# Patient Record
Sex: FEMALE | Race: WHITE | Employment: UNEMPLOYED | URBAN - METROPOLITAN AREA
[De-identification: names, ages, dates, MRNs, and addresses within clinical notes are randomized per-mention and may not be internally consistent; named-entity substitution may affect disease eponyms.]

---

## 2021-02-15 ENCOUNTER — HOSPITAL ENCOUNTER (OUTPATIENT)
Age: 51
Discharge: HOME | End: 2021-02-17

## 2021-02-15 PROCEDURE — 88305 TISSUE EXAM BY PATHOLOGIST: CPT

## 2021-02-15 PROCEDURE — 88360 TUMOR IMMUNOHISTOCHEM/MANUAL: CPT

## 2021-04-02 ENCOUNTER — HOSPITAL ENCOUNTER (OUTPATIENT)
Age: 51
Discharge: HOME | End: 2021-04-04

## 2021-04-02 PROCEDURE — 88333 PATH CONSLTJ SURG CYTO XM 1: CPT

## 2021-04-02 PROCEDURE — 88329 PATH CONSLTJ DRG SURG: CPT

## 2021-04-02 PROCEDURE — 88307 TISSUE EXAM BY PATHOLOGIST: CPT

## 2021-04-02 PROCEDURE — 88342 IMHCHEM/IMCYTCHM 1ST ANTB: CPT

## 2021-08-09 ENCOUNTER — TELEPHONE (OUTPATIENT)
Dept: CASE MANAGEMENT | Age: 51
End: 2021-08-09

## 2021-08-09 ENCOUNTER — TELEPHONE (OUTPATIENT)
Dept: INFUSION THERAPY | Age: 51
End: 2021-08-09

## 2021-08-09 ENCOUNTER — HOSPITAL ENCOUNTER (OUTPATIENT)
Dept: RADIATION ONCOLOGY | Age: 51
Discharge: HOME | End: 2021-08-09
Payer: COMMERCIAL

## 2021-08-09 VITALS
SYSTOLIC BLOOD PRESSURE: 130 MMHG | HEART RATE: 79 BPM | DIASTOLIC BLOOD PRESSURE: 82 MMHG | WEIGHT: 157.9 LBS | RESPIRATION RATE: 18 BRPM | TEMPERATURE: 97.4 F | OXYGEN SATURATION: 99 %

## 2021-08-09 DIAGNOSIS — C50.912 MALIGNANT NEOPLASM OF LEFT FEMALE BREAST, UNSPECIFIED ESTROGEN RECEPTOR STATUS, UNSPECIFIED SITE OF BREAST (HCC): Primary | ICD-10-CM

## 2021-08-09 PROCEDURE — 99205 OFFICE O/P NEW HI 60 MIN: CPT

## 2021-08-09 PROCEDURE — 99245 OFF/OP CONSLTJ NEW/EST HI 55: CPT | Performed by: RADIOLOGY

## 2021-08-09 NOTE — TELEPHONE ENCOUNTER
Benefits Investigation    Insurance: [facility]   Phone#:   ID#: [id number]   Group #:[id number]  Spoke with: Epic Registration  Reference #: 8/9/21 @11:28am    Calendar Year : yes     Chemo Auth Needed: yes    Annual Deductible Amount:  $300  Amount met to date: $300  Out-of-Pocket Max Amount: $1500   Amount met to date: $1500  Lifetime Maximum Amount: $unlimited   Amount met to date: $n/a    Patient will be covered at 100% of the allowed amount.  Electronically signed by Betty Cage on 8/9/2021 at 11:29 AM

## 2021-08-09 NOTE — PROGRESS NOTES
Mark Dress  1970 46 y.o. Referring Physician: Dr. Ned Whitehead    PCP: Randalyn Scheuermann, MD     Vitals:    21 1013   BP: 130/82   Pulse: 79   Resp: 18   Temp: 97.4 °F (36.3 °C)   SpO2: 99%        Wt Readings from Last 3 Encounters:   21 157 lb 14.4 oz (71.6 kg)        There is no height or weight on file to calculate BMI. Chief Complaint: No chief complaint on file. Cancer Staging  No matching staging information was found for the patient. Prior Radiation Therapy? NO    Concurrent Chemo/radiation? NO    Prior Chemotherapy? YES: Site Treated: l breast          Facility: [facility]          Date: current    Prior Hormonal Therapy? NO    Head and Neck Cancer? No, patient does NOT have HN cancer. LMP:     Age at first Menses: 4    : 3    Para: 2        No current outpatient medications on file. No current facility-administered medications for this encounter.        Past Medical History:   Diagnosis Date    Cancer [facility])        Past Surgical History:   Procedure Laterality Date    BREAST SURGERY      HYSTERECTOMY         Family History   Problem Relation Age of Onset    Breast Cancer Mother        Social History     Socioeconomic History    Marital status:      Spouse name: Not on file    Number of children: 2    Years of education: Not on file    Highest education level: Not on file   Occupational History    Not on file   Tobacco Use    Smoking status: Former Smoker     Packs/day: 0.25     Years: 6.00     Pack years: 1.50     Types: Cigarettes     Quit date: 2021     Years since quittin.3    Smokeless tobacco: Never Used   Vaping Use    Vaping Use: Never used   Substance and Sexual Activity    Alcohol use: Not Currently    Drug use: Yes     Frequency: 2.0 times per week     Types: Marijuana    Sexual activity: Not on file   Other Topics Concern    Not on file   Social History Narrative    Not on file     Social Determinants of Health     Financial Resource Strain:     Difficulty of Paying Living Expenses:    Food Insecurity:     Worried About Running Out of Food in the Last Year:     [address] in the Last Year:    Transportation Needs:     Lack of Transportation (Medical):  Lack of Transportation (Non-Medical):    Physical Activity:     Days of Exercise per Week:     Minutes of Exercise per Session:    Stress:     Feeling of Stress :    Social Connections:     Frequency of Communication with Friends and Family:     Frequency of Social Gatherings with Friends and Family:     Attends L[employer] Services:     Active Member of Clubs or Organizations:     Attends Club or Organization Meetings:     Marital Status:    Intimate Partner Violence:     Fear of Current or Ex-Partner:     Emotionally Abused:     Physically Abused:     Sexually Abused:            Occupation: unemployed  Retired:  NO        REVIEW OF SYSTEMS: <<For Level 5, 10 or more systems>>   Approximately 20 minutes was spent with patient about radiation therapy to her breast utilizing handouts and slides. In November 2020 pt had a mammogram showing abnormal left UIQ breast. On 2/15/21 pt had a L breast bx showing a grade 3 IDC ER/IA/HER2 negative. On 3/20/21 pt had an MRI of her breast showing a left breast lesion 9 o'clock measuring 2.4cm and no axilla involvement. On 3/11/21 ot had a CT scan which was negative of metastatic disease. PT also had a bone scan on 3/11/21 also showing no evidence of metastatic disease. On 4/2/21 pt had a left lumpectomy and sentinel lymph node excision , the SLN was negative, 1.9cm timor, no lymphovascular invasion, [id number], ER/IA/HER2 negative. Pt started chemo on 5/6/2021 with Taxotere and Cytoxan and completed chemo 3 weeks ago. Pt verbalized understanding of care and all questions were answered from a nursing perspective.   Pacemaker/Defibulator/ICD:  No    Mediport: Yes        FALLS RISK SCREENING ASSESSMENT    Instructions:  Assess the patient and enter the appropriate indicators that are present for fall risk identification. Total the numbers entered and assign a fall risk score from Table 2.  Reassess patient at a minimum every 12 weeks or with status change. Assessment   Date  8/9/2021     1. Mental Ability: confusion/cognitively impaired No - 0       2. Elimination Issues: incontinence, frequency No - 0       3. Ambulatory: use of assistive devices (walker, cane, off-loading devices), attached to equipment (IV pole, oxygen) No - 0     4. Sensory Limitations: dizziness, vertigo, impaired vision Yes - 3       5. Age Less than 65 years - 0       6. Medication: diuretics, strong analgesics, hypnotics, sedatives, antihypertensive agents   No - 0   7. Falls:  recent history of falls within the last 3 months (not to include slipping or tripping)   No - 0   TOTAL 3    If score of 4 or greater was education given? Yes       TABLE 2   Risk Score Risk Level Plan of Care   0-3 Little or  No Risk 1. Provide assistance as indicated for ambulation activities  2. Reorient confused/cognitively impaired patient  3. Call-light/bell within patient's reach  4. Chair/bed in low position, stretcher/bed with siderails up except when performing patient care activities  5. Educate patient/family/caregiver on falls prevention  6.  Reassess in 12 weeks or with any noted change in patient condition which places them at a risk for a fall   4-6 Moderate Risk 1. Provide assistance as indicated for ambulation activities  2. Reorient confused/cognitively impaired patient  3. Call-light/bell within patient's reach  4. Chair/bed in low position, stretcher/bed with siderails up except when performing patient care activities  5. Educate patient/family/caregiver on falls prevention  6. Falls risk precaution (Yellow sticker Level II) placed on patient chart   7 or   Higher High Risk 1.   Place patient in easily observable ANSWERED YES: No          PT ASSESSMENT FOR REFERRAL    · Have you had any recent falls in the past 2 months: No     · Do you have difficulty going up/down stairs: No     · Are you having difficulty walking: No     · Do you often hold onto furniture/environmental supports or feel off balance when you are walking: No     · Do you need to take rest breaks when you are walking: No     · Any pain on a scale of 1-10 that limits your mobility: No 0/10    ARE ANY OF THE ABOVE ARE ANSWERED YES: No           LYMPHEDEMA SCREENING ASSESSMENT FOR PATIENTS WITH BREAST CANCER    The patient reports the following signs/symptoms of lymphedema: None    Please ask the provider to assess patient for lymphedema for any reported signs or symptoms so a referral to Lymphedema Therapy can be considered. PREHAB AUDIOLOGY REFERRAL    - Is patient planned to receive Cisplatin? No. This patient is not planned to start Cisplatin. - Is patient planned to receive radiation therapy that may be directed toward auditory canals or nerves? No. Patient is not planned to start radiation therapy to auditory canals or nerves. - Is patient complaining of new onset hearing loss? No. Patient is not complaining of new onset hearing loss. Patient education given on radiation therapy to her left breast. The patient expresses understanding and acceptance of instructions.  Chente Cruz RN 8/9/2021 10:17 AM           Chente Cruz RN

## 2021-08-09 NOTE — TELEPHONE ENCOUNTER
Met with patient during her initial consultation with Dr. Khoi Booker  for her recent Breast cancer diagnosis. Introduced myself and explained my role with patients receiving treatment at our center. Patient was friendly and receptive. She follows with Dr Richard Raines for Medical Oncology and completed adjuvant chemo with Taxotere/Cytoxan approximately 3 weeks ago per the patient. 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 Copy of her pathology findings given including cancer type. Instructed on next steps including radiation treatments per Dr. Moss  recommendations and follow up care. Provided patient with  literature  on Patient Resource Breast Cancer, [facility]. Reviewed resources available including Social Work, Dietician, and Financial Navigator. Provided with my contact information and instructed patient to call me with questions or concerns. Verbalizes understanding. Patient appreciative of visit. Will continue to follow.

## 2021-08-11 ENCOUNTER — HOSPITAL ENCOUNTER (OUTPATIENT)
Dept: RADIATION ONCOLOGY | Age: 51
Discharge: HOME | End: 2021-08-11
Attending: SPECIALIST
Payer: COMMERCIAL

## 2021-08-11 PROCEDURE — 77290 THER RAD SIMULAJ FIELD CPLX: CPT | Performed by: SPECIALIST

## 2021-08-11 PROCEDURE — 77332 RADIATION TREATMENT AID(S): CPT | Performed by: SPECIALIST

## 2021-08-11 PROCEDURE — 77332 RADIATION TREATMENT AID(S): CPT | Performed by: RADIOLOGY

## 2021-08-11 PROCEDURE — 77263 THER RADIOLOGY TX PLNG CPLX: CPT | Performed by: RADIOLOGY

## 2021-08-12 ENCOUNTER — APPOINTMENT (OUTPATIENT)
Dept: RADIATION ONCOLOGY | Age: 51
End: 2021-08-12
Attending: SPECIALIST
Payer: COMMERCIAL

## 2021-08-23 ENCOUNTER — HOSPITAL ENCOUNTER (OUTPATIENT)
Dept: RADIATION ONCOLOGY | Age: 51
Discharge: HOME | End: 2021-08-23
Attending: RADIOLOGY
Payer: COMMERCIAL

## 2021-08-23 PROCEDURE — 77338 DESIGN MLC DEVICE FOR IMRT: CPT | Performed by: RADIOLOGY

## 2021-08-23 PROCEDURE — 77301 RADIOTHERAPY DOSE PLAN IMRT: CPT | Performed by: RADIOLOGY

## 2021-08-23 PROCEDURE — 77293 RESPIRATOR MOTION MGMT SIMUL: CPT | Performed by: RADIOLOGY

## 2021-08-23 PROCEDURE — 77300 RADIATION THERAPY DOSE PLAN: CPT | Performed by: RADIOLOGY

## 2021-08-25 ENCOUNTER — HOSPITAL ENCOUNTER (OUTPATIENT)
Dept: RADIATION ONCOLOGY | Age: 51
Discharge: HOME | End: 2021-08-25
Attending: RADIOLOGY
Payer: COMMERCIAL

## 2021-08-25 VITALS
TEMPERATURE: 97.6 F | DIASTOLIC BLOOD PRESSURE: 78 MMHG | HEART RATE: 78 BPM | OXYGEN SATURATION: 98 % | RESPIRATION RATE: 18 BRPM | SYSTOLIC BLOOD PRESSURE: 132 MMHG

## 2021-08-25 PROCEDURE — 77014 PR CT GUIDANCE PLACEMENT RAD THERAPY FIELDS: CPT | Performed by: RADIOLOGY

## 2021-08-25 PROCEDURE — 77385 HC NTSTY MODUL RAD TX DLVR SMPL: CPT | Performed by: RADIOLOGY

## 2021-08-25 NOTE — PROGRESS NOTES
Kiko Cruz  8/25/2021  Wt Readings from Last 3 Encounters:   08/09/21 157 lb 14.4 oz (71.6 kg)     There is no height or weight on file to calculate BMI. Treatment Area:CTV L breast    Patient was seen today for weekly visit. Comfort Alteration  KPS:90%  Fatigue: Mild    Nutritional Alteration  Anorexia: No   Nausea: No   Vomiting: No     Skin Alteration   Sensation:no    Radiation Dermatitis:  no    Mucous Membrane Alteration  Drainage: No  Lymphedema: No    Emotional  Coping: effective    Sexuality Alteration  na    Injury, potential bleeding or infection: no        Lab Results   Component Value Date    WBC 8.8 12/07/2016     12/07/2016         /78   Pulse 78   Temp 97.6 °F (36.4 °C) (Temporal)   Resp 18   SpO2 98%   BP within normal range?  yes        Assessment/Plan: Pt is tolerating tx well thus far/,    Leandra Hamlin RN

## 2021-08-26 ENCOUNTER — HOSPITAL ENCOUNTER (OUTPATIENT)
Dept: RADIATION ONCOLOGY | Age: 51
Discharge: HOME | End: 2021-08-26
Attending: RADIOLOGY
Payer: COMMERCIAL

## 2021-08-26 PROCEDURE — 77385 HC NTSTY MODUL RAD TX DLVR SMPL: CPT | Performed by: RADIOLOGY

## 2021-08-27 ENCOUNTER — HOSPITAL ENCOUNTER (OUTPATIENT)
Dept: RADIATION ONCOLOGY | Age: 51
Discharge: HOME | End: 2021-08-27
Attending: RADIOLOGY
Payer: COMMERCIAL

## 2021-08-27 PROCEDURE — 77385 HC NTSTY MODUL RAD TX DLVR SMPL: CPT | Performed by: RADIOLOGY

## 2021-08-30 ENCOUNTER — HOSPITAL ENCOUNTER (OUTPATIENT)
Dept: RADIATION ONCOLOGY | Age: 51
Discharge: HOME | End: 2021-08-30
Attending: RADIOLOGY
Payer: COMMERCIAL

## 2021-08-30 PROCEDURE — 77385 HC NTSTY MODUL RAD TX DLVR SMPL: CPT | Performed by: RADIOLOGY

## 2021-08-31 ENCOUNTER — HOSPITAL ENCOUNTER (OUTPATIENT)
Dept: RADIATION ONCOLOGY | Age: 51
Discharge: HOME | End: 2021-08-31
Attending: RADIOLOGY
Payer: COMMERCIAL

## 2021-08-31 PROCEDURE — 77336 RADIATION PHYSICS CONSULT: CPT | Performed by: RADIOLOGY

## 2021-08-31 PROCEDURE — 77385 HC NTSTY MODUL RAD TX DLVR SMPL: CPT | Performed by: RADIOLOGY

## 2021-09-01 ENCOUNTER — HOSPITAL ENCOUNTER (OUTPATIENT)
Dept: RADIATION ONCOLOGY | Age: 51
Discharge: HOME | End: 2021-09-01
Attending: RADIOLOGY
Payer: COMMERCIAL

## 2021-09-01 VITALS
DIASTOLIC BLOOD PRESSURE: 78 MMHG | OXYGEN SATURATION: 98 % | TEMPERATURE: 96.8 F | HEART RATE: 75 BPM | SYSTOLIC BLOOD PRESSURE: 120 MMHG | RESPIRATION RATE: 18 BRPM | WEIGHT: 153.5 LBS

## 2021-09-01 DIAGNOSIS — C50.919 MALIGNANT NEOPLASM OF FEMALE BREAST, UNSPECIFIED ESTROGEN RECEPTOR STATUS, UNSPECIFIED LATERALITY, UNSPECIFIED SITE OF BREAST (HCC): Primary | ICD-10-CM

## 2021-09-01 PROCEDURE — 77385 HC NTSTY MODUL RAD TX DLVR SMPL: CPT | Performed by: RADIOLOGY

## 2021-09-01 PROCEDURE — 99999 PR OFFICE/OUTPT VISIT,PROCEDURE ONLY: CPT | Performed by: RADIOLOGY

## 2021-09-01 ASSESSMENT — PAIN DESCRIPTION - FREQUENCY: FREQUENCY: INTERMITTENT

## 2021-09-01 ASSESSMENT — PAIN DESCRIPTION - DESCRIPTORS: DESCRIPTORS: DISCOMFORT

## 2021-09-01 ASSESSMENT — PAIN DESCRIPTION - ORIENTATION: ORIENTATION: LEFT

## 2021-09-01 ASSESSMENT — PAIN SCALES - GENERAL: PAINLEVEL_OUTOF10: 5

## 2021-09-01 ASSESSMENT — PAIN DESCRIPTION - LOCATION: LOCATION: BREAST

## 2021-09-01 NOTE — PROGRESS NOTES
Phyllis Infante  9/1/2021  Wt Readings from Last 3 Encounters:   09/01/21 153 lb 8 oz (69.6 kg)   08/09/21 157 lb 14.4 oz (71.6 kg)     There is no height or weight on file to calculate BMI. Treatment Area:Left breast    Patient was seen today for weekly visit. Comfort Alteration  KPS:90%  Fatigue: None    Nutritional Alteration  Anorexia: No   Nausea: No   Vomiting: No     Skin Alteration   Sensation:itching    Radiation Dermatitis:  None, moisturizing skin at least 3 times    Mucous Membrane Alteration  Drainage: No  Lymphedema: No    Emotional  Coping: effective    Sexuality Alteration  na    Injury, potential bleeding or infection: na        Lab Results   Component Value Date    WBC 8.8 12/07/2016     12/07/2016         Pulse 75   Temp 96.8 °F (36 °C) (Temporal)   Resp 18   Wt 153 lb 8 oz (69.6 kg)   SpO2 98%   BP within normal range?  yes     Assessment/Plan:  Completed 6/21 fractions; 1596/5256 cGy, no complaints    Rosario Mckeon RN

## 2021-09-02 ENCOUNTER — HOSPITAL ENCOUNTER (OUTPATIENT)
Dept: RADIATION ONCOLOGY | Age: 51
Discharge: HOME | End: 2021-09-02
Attending: RADIOLOGY
Payer: COMMERCIAL

## 2021-09-02 PROCEDURE — 77385 HC NTSTY MODUL RAD TX DLVR SMPL: CPT | Performed by: RADIOLOGY

## 2021-09-02 NOTE — PROGRESS NOTES
DEPARTMENT OF RADIATION ONCOLOGY ON TREATMENT VISIT         9/2/2021      NAME:  Phil Agudelo    YOB: 1970    Diagnosis: breast cancer    SUBJECTIVE:   Phil Agudelo has now received fractionated external beam radiation therapy - ongoing. Past medical, surgical, social and family histories reviewed and updated as indicated. Pain: controlled    ALLERGIES:  Patient has no known allergies. No current outpatient medications on file. No current facility-administered medications for this encounter. OBJECTIVE:  Alert and fully ambulatory. Pleasant and conversant. Physical Examination: General appearance - alert, well appearing, and in no distress. Wt Readings from Last 3 Encounters:   09/01/21 153 lb 8 oz (69.6 kg)   08/09/21 157 lb 14.4 oz (71.6 kg)         ASSESSMENT/PLAN:     Patient is tolerating treatments well with expected toxicities. RBA were reviewed prior to first fraction and PRN. Current and planned dose reviewed. Goals of treatment and potential side effects were reviewed with the patient PRN. Treatment imaging has been personally reviewed for accuracy and precision. Questions answered to apparent satisfaction. Treatments will continue as planned. Jennifer Ritchie.  Gonsalo Thornton MD MS SHAJI  Radiation Oncologist        PHYSICIANS [facility] ([address] [place] [address]): [phone] /// FAX: [phone]  [facility]): [phone] /// FAX: [phone]  [address] [place] [address]): [phone] /// FAX: [phone]

## 2021-09-03 ENCOUNTER — HOSPITAL ENCOUNTER (OUTPATIENT)
Dept: RADIATION ONCOLOGY | Age: 51
Discharge: HOME | End: 2021-09-03
Attending: RADIOLOGY
Payer: COMMERCIAL

## 2021-09-03 PROCEDURE — 77385 HC NTSTY MODUL RAD TX DLVR SMPL: CPT | Performed by: RADIOLOGY

## 2021-09-07 ENCOUNTER — HOSPITAL ENCOUNTER (OUTPATIENT)
Dept: RADIATION ONCOLOGY | Age: 51
Discharge: HOME | End: 2021-09-07
Attending: RADIOLOGY
Payer: COMMERCIAL

## 2021-09-07 PROCEDURE — 77385 HC NTSTY MODUL RAD TX DLVR SMPL: CPT | Performed by: RADIOLOGY

## 2021-09-07 PROCEDURE — 77300 RADIATION THERAPY DOSE PLAN: CPT | Performed by: RADIOLOGY

## 2021-09-07 PROCEDURE — 77334 RADIATION TREATMENT AID(S): CPT | Performed by: RADIOLOGY

## 2021-09-08 ENCOUNTER — HOSPITAL ENCOUNTER (OUTPATIENT)
Dept: RADIATION ONCOLOGY | Age: 51
Discharge: HOME | End: 2021-09-08
Attending: RADIOLOGY
Payer: COMMERCIAL

## 2021-09-08 VITALS
SYSTOLIC BLOOD PRESSURE: 106 MMHG | WEIGHT: 151 LBS | HEART RATE: 84 BPM | DIASTOLIC BLOOD PRESSURE: 62 MMHG | OXYGEN SATURATION: 99 % | TEMPERATURE: 97 F | RESPIRATION RATE: 18 BRPM

## 2021-09-08 DIAGNOSIS — C50.919 MALIGNANT NEOPLASM OF FEMALE BREAST, UNSPECIFIED ESTROGEN RECEPTOR STATUS, UNSPECIFIED LATERALITY, UNSPECIFIED SITE OF BREAST (HCC): Primary | ICD-10-CM

## 2021-09-08 PROCEDURE — 77014 PR CT GUIDANCE PLACEMENT RAD THERAPY FIELDS: CPT | Performed by: RADIOLOGY

## 2021-09-08 PROCEDURE — 77336 RADIATION PHYSICS CONSULT: CPT | Performed by: RADIOLOGY

## 2021-09-08 PROCEDURE — 77385 HC NTSTY MODUL RAD TX DLVR SMPL: CPT | Performed by: RADIOLOGY

## 2021-09-08 ASSESSMENT — PAIN DESCRIPTION - ORIENTATION: ORIENTATION: LEFT

## 2021-09-08 ASSESSMENT — PAIN DESCRIPTION - LOCATION: LOCATION: BREAST

## 2021-09-08 ASSESSMENT — PAIN DESCRIPTION - FREQUENCY: FREQUENCY: INTERMITTENT

## 2021-09-08 ASSESSMENT — PAIN DESCRIPTION - DESCRIPTORS: DESCRIPTORS: OTHER (COMMENT)

## 2021-09-08 ASSESSMENT — PAIN SCALES - GENERAL: PAINLEVEL_OUTOF10: 5

## 2021-09-08 NOTE — PROGRESS NOTES
Pascual Brock  9/8/2021  Wt Readings from Last 3 Encounters:   09/08/21 151 lb (68.5 kg)   09/01/21 153 lb 8 oz (69.6 kg)   08/09/21 157 lb 14.4 oz (71.6 kg)     There is no height or weight on file to calculate BMI. Treatment Area:Left breast    Patient was seen today for weekly visit. Comfort Alteration  KPS:90%  Fatigue: Mild    Nutritional Alteration  Anorexia: Yes   Nausea: Yes/ a little bit  Vomiting: No     Skin Alteration   Sensation:itching    Radiation Dermatitis:  Discoloration, moisturizing skin at least 2 times daily     Mucous Membrane Alteration  Drainage: No  Lymphedema: No    Emotional  Coping: effective    Sexuality Alteration  na    Injury, potential bleeding or infection: na        Lab Results   Component Value Date    WBC 8.8 12/07/2016     12/07/2016         /62   Pulse 84   Temp 97 °F (36.1 °C) (Temporal)   Resp 18   Wt 151 lb (68.5 kg)   SpO2 99%   BP within normal range?  yes       Assessment/Plan:  Completed 10/21 fractions; 6979/7247 cGy     Fe Martin RN

## 2021-09-09 ENCOUNTER — HOSPITAL ENCOUNTER (OUTPATIENT)
Dept: RADIATION ONCOLOGY | Age: 51
Discharge: HOME | End: 2021-09-09
Attending: RADIOLOGY
Payer: COMMERCIAL

## 2021-09-09 PROCEDURE — 77385 HC NTSTY MODUL RAD TX DLVR SMPL: CPT | Performed by: RADIOLOGY

## 2021-09-09 PROCEDURE — 77427 RADIATION TX MANAGEMENT X5: CPT | Performed by: RADIOLOGY

## 2021-09-09 NOTE — PATIENT INSTRUCTIONS
Continue daily fractionated radiation therapy as scheduled. Please see weekly OTV note and intial consultation letter in [facility] for clinical details. Alberta Dunlap. Tavon England MD MS Rupali Bass:  [phone]   FAX: [phone]  [facility]:  [phone]   FAX:    [phone]  [facility]:  [phone]   FAX:  [phone]  Email: [email or web address]

## 2021-09-10 ENCOUNTER — HOSPITAL ENCOUNTER (OUTPATIENT)
Dept: RADIATION ONCOLOGY | Age: 51
Discharge: HOME | End: 2021-09-10
Attending: RADIOLOGY
Payer: COMMERCIAL

## 2021-09-10 PROCEDURE — 77385 HC NTSTY MODUL RAD TX DLVR SMPL: CPT | Performed by: RADIOLOGY

## 2021-09-13 ENCOUNTER — HOSPITAL ENCOUNTER (OUTPATIENT)
Dept: RADIATION ONCOLOGY | Age: 51
Discharge: HOME | End: 2021-09-13
Attending: RADIOLOGY
Payer: COMMERCIAL

## 2021-09-13 PROCEDURE — 77385 HC NTSTY MODUL RAD TX DLVR SMPL: CPT | Performed by: RADIOLOGY

## 2021-09-14 ENCOUNTER — HOSPITAL ENCOUNTER (OUTPATIENT)
Dept: RADIATION ONCOLOGY | Age: 51
Discharge: HOME | End: 2021-09-14
Attending: RADIOLOGY
Payer: COMMERCIAL

## 2021-09-14 PROCEDURE — 77385 HC NTSTY MODUL RAD TX DLVR SMPL: CPT | Performed by: RADIOLOGY

## 2021-09-15 ENCOUNTER — HOSPITAL ENCOUNTER (OUTPATIENT)
Dept: RADIATION ONCOLOGY | Age: 51
Discharge: HOME | End: 2021-09-15
Attending: RADIOLOGY
Payer: COMMERCIAL

## 2021-09-15 VITALS
WEIGHT: 152 LBS | OXYGEN SATURATION: 99 % | HEART RATE: 76 BPM | RESPIRATION RATE: 18 BRPM | TEMPERATURE: 97.6 F | SYSTOLIC BLOOD PRESSURE: 116 MMHG | DIASTOLIC BLOOD PRESSURE: 74 MMHG

## 2021-09-15 DIAGNOSIS — C50.919 MALIGNANT NEOPLASM OF FEMALE BREAST, UNSPECIFIED ESTROGEN RECEPTOR STATUS, UNSPECIFIED LATERALITY, UNSPECIFIED SITE OF BREAST (HCC): Primary | ICD-10-CM

## 2021-09-15 PROCEDURE — 77385 HC NTSTY MODUL RAD TX DLVR SMPL: CPT | Performed by: RADIOLOGY

## 2021-09-15 PROCEDURE — 77336 RADIATION PHYSICS CONSULT: CPT | Performed by: RADIOLOGY

## 2021-09-15 PROCEDURE — 99999 PR OFFICE/OUTPT VISIT,PROCEDURE ONLY: CPT | Performed by: RADIOLOGY

## 2021-09-15 PROCEDURE — 77427 RADIATION TX MANAGEMENT X5: CPT | Performed by: RADIOLOGY

## 2021-09-15 PROCEDURE — 77014 PR CT GUIDANCE PLACEMENT RAD THERAPY FIELDS: CPT | Performed by: RADIOLOGY

## 2021-09-15 NOTE — PROGRESS NOTES
Keren Gonzalez  9/15/2021  Wt Readings from Last 3 Encounters:   09/15/21 152 lb (68.9 kg)   09/08/21 151 lb (68.5 kg)   09/01/21 153 lb 8 oz (69.6 kg)     There is no height or weight on file to calculate BMI. Treatment Area:L breast    Patient was seen today for weekly visit. Comfort Alteration  KPS:90%  Fatigue: Mild    Nutritional Alteration  Anorexia: No   Nausea: No   Vomiting: No     Skin Alteration   Sensation:open skin under left breast.     Radiation Dermatitis:  Yes. See above    Mucous Membrane Alteration  Drainage: No  Lymphedema: No    Emotional  Coping: effective    Sexuality Alteration  N/a    Injury, potential bleeding or infection: n/a        Lab Results   Component Value Date    WBC 8.8 12/07/2016     12/07/2016         /74   Pulse 76   Temp 97.6 °F (36.4 °C) (Temporal)   Resp 18   Wt 152 lb (68.9 kg)   SpO2 99%   BP within normal range? yes         Assessment/Plan: Pt completed 15/21fx and 3990/5256cGy. Pt is tolerating tx well thus far.     Jam Camejo RN

## 2021-09-16 ENCOUNTER — HOSPITAL ENCOUNTER (OUTPATIENT)
Dept: RADIATION ONCOLOGY | Age: 51
Discharge: HOME | End: 2021-09-16
Attending: RADIOLOGY
Payer: COMMERCIAL

## 2021-09-16 PROCEDURE — 77385 HC NTSTY MODUL RAD TX DLVR SMPL: CPT | Performed by: RADIOLOGY

## 2021-09-16 NOTE — PROGRESS NOTES
DEPARTMENT OF RADIATION ONCOLOGY ON TREATMENT VISIT         9/16/2021      NAME:  Stevphen Baumgarten    YOB: 1970    Diagnosis: breast cancer    SUBJECTIVE:   Stevphen Baumgarten has now received fractionated external beam radiation therapy - ongoing. Past medical, surgical, social and family histories reviewed and updated as indicated. Pain: controlled    ALLERGIES:  Patient has no known allergies. No current outpatient medications on file. No current facility-administered medications for this encounter. OBJECTIVE:  Alert and fully ambulatory. Pleasant and conversant. Physical Examination: General appearance - alert, well appearing, and in no distress. Wt Readings from Last 3 Encounters:   09/15/21 152 lb (68.9 kg)   09/08/21 151 lb (68.5 kg)   09/01/21 153 lb 8 oz (69.6 kg)         ASSESSMENT/PLAN:     Patient is tolerating treatments well with expected toxicities. RBA were reviewed prior to first fraction and PRN. Current and planned dose reviewed. Goals of treatment and potential side effects were reviewed with the patient PRN. Treatment imaging has been personally reviewed for accuracy and precision. Questions answered to apparent satisfaction. Treatments will continue as planned. Tori Ly.  Bill Teresa MD MS SHAJI  Radiation Oncologist        PHYSICIANS [facility] ([address] [place] [address]): [phone] /// FAX: [phone]  [facility]): [phone] /// FAX: [phone]  [address] [place] [address]): [phone] /// FAX: [phone]

## 2021-09-16 NOTE — PATIENT INSTRUCTIONS
Continue daily fractionated radiation therapy as scheduled. Please see weekly OTV note and intial consultation letter in [facility] for clinical details. Josh Mccoy. John Grey MD MS Sarah Wells:  [phone]   FAX: [phone]  [facility]:  [phone]   FAX:    [phone]  [facility]:  [phone]   FAX:  [phone]  Email: [email or web address]

## 2021-09-17 ENCOUNTER — HOSPITAL ENCOUNTER (OUTPATIENT)
Dept: RADIATION ONCOLOGY | Age: 51
Discharge: HOME | End: 2021-09-17
Attending: RADIOLOGY
Payer: COMMERCIAL

## 2021-09-17 PROCEDURE — 77412 RADIATION TX DELIVERY LVL 3: CPT | Performed by: RADIOLOGY

## 2021-09-20 ENCOUNTER — HOSPITAL ENCOUNTER (OUTPATIENT)
Dept: RADIATION ONCOLOGY | Age: 51
Discharge: HOME | End: 2021-09-20
Attending: RADIOLOGY
Payer: COMMERCIAL

## 2021-09-20 VITALS
OXYGEN SATURATION: 98 % | TEMPERATURE: 97.6 F | RESPIRATION RATE: 18 BRPM | SYSTOLIC BLOOD PRESSURE: 126 MMHG | HEART RATE: 74 BPM | WEIGHT: 149.2 LBS | DIASTOLIC BLOOD PRESSURE: 78 MMHG

## 2021-09-20 DIAGNOSIS — C50.919 MALIGNANT NEOPLASM OF FEMALE BREAST, UNSPECIFIED ESTROGEN RECEPTOR STATUS, UNSPECIFIED LATERALITY, UNSPECIFIED SITE OF BREAST (HCC): Primary | ICD-10-CM

## 2021-09-20 PROCEDURE — 77412 RADIATION TX DELIVERY LVL 3: CPT | Performed by: RADIOLOGY

## 2021-09-20 PROCEDURE — 99999 PR OFFICE/OUTPT VISIT,PROCEDURE ONLY: CPT | Performed by: RADIOLOGY

## 2021-09-20 ASSESSMENT — PAIN DESCRIPTION - ORIENTATION: ORIENTATION: LEFT

## 2021-09-20 ASSESSMENT — PAIN DESCRIPTION - DESCRIPTORS: DESCRIPTORS: BURNING

## 2021-09-20 ASSESSMENT — PAIN SCALES - GENERAL: PAINLEVEL_OUTOF10: 8

## 2021-09-20 ASSESSMENT — PAIN DESCRIPTION - FREQUENCY: FREQUENCY: CONTINUOUS

## 2021-09-20 ASSESSMENT — PAIN DESCRIPTION - LOCATION: LOCATION: BREAST

## 2021-09-20 NOTE — PROGRESS NOTES
Fabiola Nicole  9/20/2021  Wt Readings from Last 3 Encounters:   09/20/21 149 lb 3.2 oz (67.7 kg)   09/15/21 152 lb (68.9 kg)   09/08/21 151 lb (68.5 kg)     There is no height or weight on file to calculate BMI. Treatment Area:L breast    Patient was seen today for weekly visit. Comfort Alteration  KPS:90%  Fatigue: Moderate    Nutritional Alteration  Anorexia: No   Nausea: No   Vomiting: No     Skin Alteration   Sensation:yes open skin under neath left breast    Radiation Dermatitis:  Yes roby above    Mucous Membrane Alteration  Drainage: No  Lymphedema: No    Emotional  Coping: somewhat effective    Sexuality Alteration  n/a    Injury, potential bleeding or infection: yes open skin        Lab Results   Component Value Date    WBC 8.8 12/07/2016     12/07/2016         /78   Pulse 74   Temp 97.6 °F (36.4 °C) (Temporal)   Resp 18   Wt 149 lb 3.2 oz (67.7 kg)   SpO2 98%   BP within normal range? yes      Assessment/Plan: Pt completed 18/21fx and 4656/5256cGy. Pt is tolerating tx well.     Kane Duvall, RN

## 2021-09-21 ENCOUNTER — HOSPITAL ENCOUNTER (OUTPATIENT)
Dept: RADIATION ONCOLOGY | Age: 51
Discharge: HOME | End: 2021-09-21
Attending: RADIOLOGY
Payer: COMMERCIAL

## 2021-09-21 PROCEDURE — 77412 RADIATION TX DELIVERY LVL 3: CPT | Performed by: RADIOLOGY

## 2021-09-21 NOTE — PROGRESS NOTES
DEPARTMENT OF RADIATION ONCOLOGY ON TREATMENT VISIT         9/21/2021      NAME:  Yanna Bajwa    YOB: 1970    Diagnosis: breast cancer    SUBJECTIVE:   Yanna Bajwa has now received fractionated external beam radiation therapy - ongoing. Past medical, surgical, social and family histories reviewed and updated as indicated. Pain: controlled    ALLERGIES:  Patient has no known allergies. Current Outpatient Medications   Medication Sig Dispense Refill    silver sulfADIAZINE (SILVADENE) 1 % cream Apply topically daily. 85 g 3     No current facility-administered medications for this encounter. OBJECTIVE:  Alert and fully ambulatory. Pleasant and conversant. Physical Examination: NAD  -skin gr 2      Wt Readings from Last 3 Encounters:   09/20/21 149 lb 3.2 oz (67.7 kg)   09/15/21 152 lb (68.9 kg)   09/08/21 151 lb (68.5 kg)         ASSESSMENT/PLAN:     Patient is tolerating treatments well with expected toxicities. RBA were reviewed prior to first fraction and PRN. Current and planned dose reviewed. Goals of treatment and potential side effects were reviewed with the patient PRN. Treatment imaging has been personally reviewed for accuracy and precision. Questions answered to apparent satisfaction. Treatments will continue as planned.      -Eddie Cuevas.  Sean Harrison MD MS VELASQUEZR  Radiation Oncologist        [facility] ([address] [place] [address]): [phone] /// FAX: [phone]  [facility]): [phone] /// FAX: [phone]  [address] [place] [address]): [phone] /// FAX: [phone]

## 2021-09-21 NOTE — PATIENT INSTRUCTIONS
Continue daily fractionated radiation therapy as scheduled. Please see weekly OTV note and intial consultation letter in [facility] for clinical details. Basil Mathews. Karen Roque MD MS Lynette Hernandez:  [phone]   FAX: [phone]  [facility]:  [phone]   FAX:    [phone]  [address] [place] [address] Road:  [phone]   FAX:  [phone]  Email: [email or web address]

## 2021-09-22 ENCOUNTER — HOSPITAL ENCOUNTER (OUTPATIENT)
Dept: RADIATION ONCOLOGY | Age: 51
Discharge: HOME | End: 2021-09-22
Attending: RADIOLOGY
Payer: COMMERCIAL

## 2021-09-22 PROCEDURE — 77336 RADIATION PHYSICS CONSULT: CPT | Performed by: RADIOLOGY

## 2021-09-22 PROCEDURE — 77427 RADIATION TX MANAGEMENT X5: CPT | Performed by: RADIOLOGY

## 2021-09-22 PROCEDURE — 77412 RADIATION TX DELIVERY LVL 3: CPT | Performed by: RADIOLOGY

## 2021-09-23 ENCOUNTER — HOSPITAL ENCOUNTER (OUTPATIENT)
Dept: RADIATION ONCOLOGY | Age: 51
Discharge: HOME | End: 2021-09-23
Attending: RADIOLOGY
Payer: COMMERCIAL

## 2021-09-23 PROCEDURE — 77412 RADIATION TX DELIVERY LVL 3: CPT | Performed by: RADIOLOGY

## 2021-10-19 ENCOUNTER — TELEPHONE (OUTPATIENT)
Dept: ONCOLOGY | Age: 51
End: 2021-10-19

## 2021-10-19 ENCOUNTER — TELEPHONE (OUTPATIENT)
Dept: RADIATION ONCOLOGY | Age: 51
End: 2021-10-19

## 2021-10-19 NOTE — TELEPHONE ENCOUNTER
Rad Onc nurse returned a call to patient about her return to work forms. Waiting a call back from the message left.

## 2021-10-20 ENCOUNTER — TELEPHONE (OUTPATIENT)
Dept: ONCOLOGY | Age: 51
End: 2021-10-20

## 2021-10-20 NOTE — TELEPHONE ENCOUNTER
Spoke with pt again re: disability paperwork. Noted that  is not in office on this date to review and sign. Advised that pt will additionally need to complete \"Statement of Claim for Sickness and Accident Benefit\" included in packet and requiring pt's signature. Forms to be forwarded to MD for signature, pt to  completed forms. No additional needs identified at this time. Reviewed role of oncology SW and encouraged pt to notify this provider if additional needs arise.     Hugo Nair, MSW, LISW-S  Oncology Social Worker

## 2021-11-02 ENCOUNTER — HOSPITAL ENCOUNTER (OUTPATIENT)
Dept: RADIATION ONCOLOGY | Age: 51
Discharge: HOME | End: 2021-11-02
Attending: RADIOLOGY
Payer: COMMERCIAL

## 2021-11-02 VITALS
DIASTOLIC BLOOD PRESSURE: 86 MMHG | WEIGHT: 151.5 LBS | OXYGEN SATURATION: 98 % | HEART RATE: 84 BPM | TEMPERATURE: 97.8 F | RESPIRATION RATE: 18 BRPM | SYSTOLIC BLOOD PRESSURE: 124 MMHG

## 2021-11-02 DIAGNOSIS — Z17.1 MALIGNANT NEOPLASM OF LEFT BREAST IN FEMALE, ESTROGEN RECEPTOR NEGATIVE, UNSPECIFIED SITE OF BREAST (HCC): ICD-10-CM

## 2021-11-02 DIAGNOSIS — C50.912 MALIGNANT NEOPLASM OF LEFT BREAST IN FEMALE, ESTROGEN RECEPTOR NEGATIVE, UNSPECIFIED SITE OF BREAST (HCC): ICD-10-CM

## 2021-11-02 PROCEDURE — 99999 PR OFFICE/OUTPT VISIT,PROCEDURE ONLY: CPT | Performed by: NURSE PRACTITIONER

## 2021-11-02 ASSESSMENT — PAIN DESCRIPTION - DESCRIPTORS: DESCRIPTORS: SHOOTING

## 2021-11-02 ASSESSMENT — PAIN SCALES - GENERAL: PAINLEVEL_OUTOF10: 4

## 2021-11-02 ASSESSMENT — PAIN DESCRIPTION - LOCATION: LOCATION: BREAST

## 2021-11-02 ASSESSMENT — PAIN DESCRIPTION - ORIENTATION: ORIENTATION: LEFT

## 2021-11-02 NOTE — PROGRESS NOTES
Kenn Zeeion  11/2/2021  10:03 AM      Vitals:    11/02/21 1002   BP: 124/86   Pulse: 84   Resp: 18   Temp: 97.8 °F (36.6 °C)   SpO2: 98%    : Wt Readings from Last 3 Encounters:   11/02/21 151 lb 8 oz (68.7 kg)   09/20/21 149 lb 3.2 oz (67.7 kg)   09/15/21 152 lb (68.9 kg)                Current Outpatient Medications:     silver sulfADIAZINE (SILVADENE) 1 % cream, Apply topically daily. , Disp: 85 g, Rfl: 3      Patient is seen today in follow up for completion of RT L breast with boost 8/25/21-9/23/21 21fx/5256cGy. Pt follows Dr. Cesario Watson last seen on 8/9/21. Pt has no questions or concerns at this time. FALLS RISK SCREENING ASSESSMENT    Instructions:  Assess the patient and enter the appropriate indicators that are present for fall risk identification. Total the numbers entered and assign a fall risk score from Table 2.  Reassess patient at a minimum every 12 weeks or with status change. Assessment   Date  11/2/2021     1. Mental Ability: confusion/cognitively impaired No - 0       2. Elimination Issues: incontinence, frequency No - 0       3. Ambulatory: use of assistive devices (walker, cane, off-loading devices), attached to equipment (IV pole, oxygen) No - 0     4. Sensory Limitations: dizziness, vertigo, impaired vision Yes - 3       5. Age Less than 65 years - 0       6. Medication: diuretics, strong analgesics, hypnotics, sedatives, antihypertensive agents   No - 0   7. Falls:  recent history of falls within the last 3 months (not to include slipping or tripping)   No - 0   TOTAL 3    If score of 4 or greater was education given? Yes       TABLE 2   Risk Score Risk Level Plan of Care   0-3 Little or  No Risk 1. Provide assistance as indicated for ambulation activities  2. Reorient confused/cognitively impaired patient  3. Call-light/bell within patient's reach  4. Chair/bed in low position, stretcher/bed with siderails up except when performing patient care activities  5. Educate patient/family/caregiver on falls prevention  6.  Reassess in 12 weeks or with any noted change in patient condition which places them at a risk for a fall   4-6 Moderate Risk 1. Provide assistance as indicated for ambulation activities  2. Reorient confused/cognitively impaired patient  3. Call-light/bell within patient's reach  4. Chair/bed in low position, stretcher/bed with siderails up except when performing patient care activities  5. Educate patient/family/caregiver on falls prevention  6. Falls risk precaution (Yellow sticker Level II) placed on patient chart   7 or   Higher High Risk 1. Place patient in easily observable treatment room  2. Patient attended at all times by family member or staff  3. Provide assistance as indicated for ambulation activities  4. Reorient confused/cognitively impaired patient  5. Call-light/bell within patient's reach  6. Chair/bed in low position, stretcher/bed with siderails up except when performing patient care activities  7. Educate patient/family/caregiver on falls prevention  8. Falls risk precaution (Yellow sticker Level III) placed on patient chart           MALNUTRITION RISK SCREENING ASSESSMENT    11/2/2021   Patient:  Mili Hernandez  Sex:  female    Instructions:  Assess the patient and enter the appropriate indicators that are present for nutrition risk identification. Total the numbers entered and assign a risk score. Follow the appropriate action for total score listed below. Assessment   Date  11/2/2021     1. Have you lost weight without trying? 0- No     2. Have you been eating poorly because of a decreased appetite? 0- No   3. Do you have a diagnosis of head and neck cancer?       0- No                                                                                    TOTAL 0          Score of 0-1: No action  Score 2 or greater:  · For Non-Diabetic Patient: Recommend adding Ensure Complete 2 x daily and provide patient with Ensure wellness bag with coupons  · For Diabetic Patient: Recommend adding Glucerna Shake 2 x daily and provide patient with Glucerna Wellness bag with coupons  · Route to the dietitian via Billie Neal RN

## 2021-11-02 NOTE — PROGRESS NOTES
RADIATION ONCOLOGY  6 week follow up         11/2/2021      NAME:  Manuelito Dai OF BIRTH:  1970    CC: Pt returns today for re-assessment of radiation treatment area. HPI: Kenn Rose is a pleasant 46 y.o. female with a diagnosis of pT1c pN0 cM0 left breast cancer (triple negative) s/p BCS, adjuvant chemotherapy and adjuvant XRT. The patient underwent a course of radiation therapy to the left breast, which was completed on 9/23/21. She completed 5256 cGy in 21 fractions which includes a 1000 cGy in 5 fraction boost.                        States that she is doing well. Skin has healed well post radiation completion. Moisturizing as needed. Completes monthly self breast exams. Denies any new lumps/ bumps/ discharge from the nipple. Pt is following with Dr Cesario Watson for medical oncology. Next appt 11/8/21. Pain: Denies pain at this time. Past medical, surgical, social and family histories reviewed and updated as indicated. ALLERGIES:  Patient has no known allergies. Current Outpatient Medications   Medication Sig Dispense Refill    silver sulfADIAZINE (SILVADENE) 1 % cream Apply topically daily. 85 g 3     No current facility-administered medications for this encounter. Physical Examination:   Vitals:    11/02/21 1002   BP: 124/86   Pulse: 84   Resp: 18   Temp: 97.8 °F (36.6 °C)   TempSrc: Temporal   SpO2: 98%   Weight: 151 lb 8 oz (68.7 kg)       Wt Readings from Last 3 Encounters:   11/02/21 151 lb 8 oz (68.7 kg)   09/20/21 149 lb 3.2 oz (67.7 kg)   09/15/21 152 lb (68.9 kg)       Alert and fully ambulatory. Pleasant and conversant. Irradiated skin shows mild hyperpigmentation and dryness. HR reg, rhythm reg. Lungs CTA.         ASSESSMENT/PLAN:     pT1c pN0 cM0 left breast cancer (triple negative) s/p BCS, adjuvant chemotherapy and adjuvant radiation therapy to the left breast  completed on 9/23/21 (5256 cGy in 21 fractions which includes a 1000 cGy in 5 fraction boost). Patient is doing well post-radiation completion. Skin care and sun care reviewed. Signs and symptoms of recurrence reviewed. Cont to follow with Dr Britt Banegas for medical oncology. Next appt 21. I discussed follow up plans with Johnson Woodruff. At this time, I will see the patient back in 6 months for a post-radiation completion follow-up visit. Johnson Woodruff is to follow up with other providers involved in their care as directed (including but not limited to Medical Oncology, Primary Care, Pulmonary, and Surgery). The patient was given our contact number in the event that if at any time they change their mind and would like to return to the clinic to see either myself or one of the Radiation Oncologists, they can simply call us and we would be happy to see them sooner. Thank you for involving us in the management of this extremely pleasant patient. More than 15 min was in direct contact with pt coordinating/giving care. >50% of the visit was spent in counseling the pt on the following: Follow up care    The nurses notes were reviewed and incorporated into this assessment and plan. Questions answered to apparent satisfaction.       Tyrell Puente, MSN, RN, APRN-CNP  Nurse Practitioner for Radiation Oncology    PHYSICIANS [facility]) [facility]: [phone] (WBQ: [phone])  [facility]) [facility]: [phone])  [address] [place] [address]) [facility]:  [phone] (ROS:  [phone])

## 2022-02-18 ENCOUNTER — HOSPITAL ENCOUNTER (OUTPATIENT)
Dept: GENERAL RADIOLOGY | Age: 52
Discharge: HOME | End: 2022-02-20
Payer: COMMERCIAL

## 2022-02-18 DIAGNOSIS — Z85.3 HISTORY OF BREAST CANCER: ICD-10-CM

## 2022-02-18 DIAGNOSIS — N64.4 PAIN OF LEFT BREAST: ICD-10-CM

## 2022-02-18 PROCEDURE — G0279 TOMOSYNTHESIS, MAMMO: HCPCS

## 2022-05-05 ENCOUNTER — HOSPITAL ENCOUNTER (OUTPATIENT)
Dept: RADIATION ONCOLOGY | Age: 52
Discharge: HOME | End: 2022-05-05
Attending: RADIOLOGY
Payer: COMMERCIAL

## 2022-05-05 VITALS
DIASTOLIC BLOOD PRESSURE: 88 MMHG | WEIGHT: 150.1 LBS | OXYGEN SATURATION: 100 % | HEART RATE: 98 BPM | RESPIRATION RATE: 18 BRPM | SYSTOLIC BLOOD PRESSURE: 138 MMHG | TEMPERATURE: 97.9 F

## 2022-05-05 PROCEDURE — 99213 OFFICE O/P EST LOW 20 MIN: CPT | Performed by: RADIOLOGY

## 2022-05-05 PROCEDURE — 99213 OFFICE O/P EST LOW 20 MIN: CPT

## 2022-05-05 NOTE — PROGRESS NOTES
RADIATION ONCOLOGY  6 week follow up         5/5/2022      NAME:  Jenae Aponte OF BIRTH:  1970    CC: Pt returns today for re-assessment of radiation treatment area. HPI: Win Deigo is a pleasant 46 y.o. female with a diagnosis of pT1c pN0 cM0 left breast cancer (triple negative) s/p BCS, adjuvant chemotherapy and adjuvant XRT. The patient underwent a course of radiation therapy to the left breast, which was completed on 9/23/21. She completed 5256 cGy in 21 fractions which includes a 1000 cGy in 5 fraction boost.                        States that she is doing well. Skin has healed well post radiation completion. Moisturizing as needed. Completes monthly self breast exams. Denies any new lumps/ bumps/ discharge from the nipple. Denies cough, shortness of breath, bone pain or weight loss. Pt is following with Dr Mary Jane Pete for medical oncology. Most recent mammogram from February 2022:        FINDINGS:   Breast tissue is heterogeneously dense which may obscure small masses. There   are breast conservation therapy changes on the left. No suspicious mass,   microcalcifications, or architectural distortion identified in either breast.           Impression   Benign findings with no mammographic evidence of malignancy in either breast.       Recommendation:       Annual bilateral mammogram is advised with consideration for annual bilateral   screening ultrasound given the patient's breast density. BIRADS:   BIRADS - CATEGORY 2         Pain: Denies pain at this time. Past medical, surgical, social and family histories reviewed and updated as indicated. ALLERGIES:  Patient has no known allergies. Current Outpatient Medications   Medication Sig Dispense Refill    silver sulfADIAZINE (SILVADENE) 1 % cream Apply topically daily. (Patient not taking: Reported on 5/5/2022) 85 g 3     No current facility-administered medications for this encounter.          Physical Examination:   Vitals:    05/05/22 0928   BP: 138/88   Pulse: 98   Resp: 18   Temp: 97.9 °F (36.6 °C)   TempSrc: Skin   SpO2: 100%   Weight: 150 lb 1.6 oz (68.1 kg)       Wt Readings from Last 3 Encounters:   05/05/22 150 lb 1.6 oz (68.1 kg)   11/02/21 151 lb 8 oz (68.7 kg)   09/20/21 149 lb 3.2 oz (67.7 kg)       Alert and fully ambulatory. Pleasant and conversant. Irradiated skin shows mild hyperpigmentation and dryness. Also some mild edema throughout the breast diffusely. No dominant masses, nodularity or suspicious skin changes. HR reg, rhythm reg. Lungs CTA. ASSESSMENT/PLAN:     pT1c pN0 cM0 left breast cancer (triple negative) s/p BCS, adjuvant chemotherapy and adjuvant radiation therapy to the left breast  completed on 9/23/21 (5256 cGy in 21 fractions which includes a 1000 cGy in 5 fraction boost). Patient is doing well post-radiation completion. Skin care and sun care reviewed. Signs and symptoms of recurrence reviewed. Cont to follow with Dr Nena Morgan for medical oncology. I discussed follow up plans with Yeimy Bingham. At this time, I will see the patient back in 6 months for a post-radiation completion follow-up visit. I anticipate as needed visits thereafter   Yeimy Bingham is to follow up with other providers involved in their care as directed (including but not limited to Medical Oncology, Primary Care, Pulmonary, and Surgery). The patient was given our contact number in the event that if at any time they change their mind and would like to return to the clinic to see either myself or one of the Radiation Oncologists, they can simply call us and we would be happy to see them sooner. Thank you for involving us in the management of this extremely pleasant patient. More than 15 min was in direct contact with pt coordinating/giving care. >50% of the visit was spent in counseling the pt on the following:   Follow up care    The nurses notes were reviewed and incorporated into this assessment and plan. Questions answered to apparent satisfaction.     Dc Pérez MD    PHYSICIANS [facility]) [facility]: [phone] (EXF: [phone])  [address] [place] [address]: [phone] (ZHY: [phone])  [address] [facility]) [facility]:  [phone] (TYN:  [phone])

## 2022-05-05 NOTE — PROGRESS NOTES
Javi Fernandez  5/5/2022  9:30 AM      Vitals:    05/05/22 0928   BP: 138/88   Pulse: 98   Resp: 18   Temp: 97.9 °F (36.6 °C)   SpO2: 100%    : Wt Readings from Last 3 Encounters:   05/05/22 150 lb 1.6 oz (68.1 kg)   11/02/21 151 lb 8 oz (68.7 kg)   09/20/21 149 lb 3.2 oz (67.7 kg)                Current Outpatient Medications:     silver sulfADIAZINE (SILVADENE) 1 % cream, Apply topically daily. (Patient not taking: Reported on 5/5/2022), Disp: 85 g, Rfl: 3      Patient is seen today in follow up with Dr. David Sepulveda. Patient received RT CTV left breast with boost, 8/25/21-9/23/21 21fx/ 5256cGY and tolerating. Patient following Dr. Saranya Rhodes 4/2022 and mammogram 2/18/2022 and was negative. FALLS RISK SCREENING ASSESSMENT    Instructions:  Assess the patient and enter the appropriate indicators that are present for fall risk identification. Total the numbers entered and assign a fall risk score from Table 2.  Reassess patient at a minimum every 12 weeks or with status change. Assessment   Date  5/5/2022     1. Mental Ability: confusion/cognitively impaired No - 0       2. Elimination Issues: incontinence, frequency No - 0       3. Ambulatory: use of assistive devices (walker, cane, off-loading devices), attached to equipment (IV pole, oxygen) No - 0     4. Sensory Limitations: dizziness, vertigo, impaired vision Yes - 3       5. Age Less than 65 years - 0       6. Medication: diuretics, strong analgesics, hypnotics, sedatives, antihypertensive agents   No - 0   7. Falls:  recent history of falls within the last 3 months (not to include slipping or tripping)   NO-0   TOTAL 3    If score of 4 or greater was education given? Yes       TABLE 2   Risk Score Risk Level Plan of Care   0-3 Little or  No Risk 1. Provide assistance as indicated for ambulation activities  2. Reorient confused/cognitively impaired patient  3. Call-light/bell within patient's reach  4.   Chair/bed in low position, stretcher/bed with siderails up except when performing patient care activities  5. Educate patient/family/caregiver on falls prevention  6.  Reassess in 12 weeks or with any noted change in patient condition which places them at a risk for a fall   4-6 Moderate Risk 1. Provide assistance as indicated for ambulation activities  2. Reorient confused/cognitively impaired patient  3. Call-light/bell within patient's reach  4. Chair/bed in low position, stretcher/bed with siderails up except when performing patient care activities  5. Educate patient/family/caregiver on falls prevention  6. Falls risk precaution (Yellow sticker Level II) placed on patient chart   7 or   Higher High Risk 1. Place patient in easily observable treatment room  2. Patient attended at all times by family member or staff  3. Provide assistance as indicated for ambulation activities  4. Reorient confused/cognitively impaired patient  5. Call-light/bell within patient's reach  6. Chair/bed in low position, stretcher/bed with siderails up except when performing patient care activities  7. Educate patient/family/caregiver on falls prevention  8. Falls risk precaution (Yellow sticker Level III) placed on patient chart           MALNUTRITION RISK SCREENING ASSESSMENT    5/5/2022   Patient:  Theodora Marcelino  Sex:  female    Instructions:  Assess the patient and enter the appropriate indicators that are present for nutrition risk identification. Total the numbers entered and assign a risk score. Follow the appropriate action for total score listed below. Assessment   Date  5/5/2022     1. Have you lost weight without trying? 0- No     2. Have you been eating poorly because of a decreased appetite? 0- No   3. Do you have a diagnosis of head and neck cancer?       0- No                                                                                    TOTAL 0          Score of 0-1: No action  Score 2 or greater:  · For Non-Diabetic Patient: Recommend adding Ensure Complete 2 x daily and provide patient with Ensure wellness bag with coupons  · For Diabetic Patient: Recommend adding Glucerna Shake 2 x daily and provide patient with Glucerna Wellness bag with coupons  · Route to the dietitian via Yue Sarkar RN

## 2022-11-08 ENCOUNTER — TELEPHONE (OUTPATIENT)
Dept: CASE MANAGEMENT | Age: 52
End: 2022-11-08

## 2022-11-08 NOTE — TELEPHONE ENCOUNTER
Called and spoke with patient. She is scheduled for follow up with ALEJANDRO Bui CNP tomorrow at 1000. Reviewed appointment information. She verified she will be here for visit tomorrow. Reviewed with her that I will be out of office tomorrow but will be leaving her Treatment Summary/Survivorship Care Plan with the Nurse Practitioner to give to her. I reviewed in detail the Treatment Summary and Survivorship Care plan information with Cookie Miller. Updated her that she will get the written copy tomorrow along with some resource information including [facility] [employer], [employer], Patient Resource Survivorship booklet and Thriving and Surviving Social Work and [employer]. She verbalized understanding of all information and was very appreciative of call and info.

## 2022-11-09 ENCOUNTER — HOSPITAL ENCOUNTER (OUTPATIENT)
Dept: RADIATION ONCOLOGY | Age: 52
Discharge: HOME | End: 2022-11-09
Attending: RADIOLOGY
Payer: COMMERCIAL

## 2022-11-09 VITALS
HEART RATE: 90 BPM | DIASTOLIC BLOOD PRESSURE: 85 MMHG | WEIGHT: 152.2 LBS | SYSTOLIC BLOOD PRESSURE: 147 MMHG | RESPIRATION RATE: 18 BRPM | TEMPERATURE: 97.6 F | OXYGEN SATURATION: 100 %

## 2022-11-09 DIAGNOSIS — Z17.1 MALIGNANT NEOPLASM OF UPPER-INNER QUADRANT OF LEFT BREAST IN FEMALE, ESTROGEN RECEPTOR NEGATIVE (HCC): Primary | ICD-10-CM

## 2022-11-09 DIAGNOSIS — Z92.3 S/P RADIATION THERAPY > 12 WKS AGO: ICD-10-CM

## 2022-11-09 DIAGNOSIS — C50.212 MALIGNANT NEOPLASM OF UPPER-INNER QUADRANT OF LEFT BREAST IN FEMALE, ESTROGEN RECEPTOR NEGATIVE (HCC): Primary | ICD-10-CM

## 2022-11-09 PROCEDURE — 99213 OFFICE O/P EST LOW 20 MIN: CPT | Performed by: NURSE PRACTITIONER

## 2022-11-09 PROCEDURE — 99213 OFFICE O/P EST LOW 20 MIN: CPT

## 2022-11-09 NOTE — PROGRESS NOTES
Radiation Oncology   6 Month Follow Up Note  11/09/2022    Kinza Colon  Vitals:    11/09/22 1007   BP: (!) 147/85   Pulse: 90   Resp: 18   Temp: 97.6 °F (36.4 °C)   SpO2: 100%     Wt Readings from Last 1 Encounters:   11/09/22 152 lb 3.2 oz (69 kg)     CC: Patient is here for 6 month Radiation Oncology follow-up visit. HPI:  Kinza Colon is a pleasant 45 y.o. female with a diagnosis of pT1c pNo cMo invasive ductal carcinoma of the upper-inner quadrant of the left breast.  S/p left breast lumpectomy with SLN excision on 04/02/2021. ER/MA negative, HER2 negative. S/p adjuvant chemotherapy 4 cycles TC completed 07/08/2021. S/p adjuvant XRT completed 09/23/2021. Total dose 5256 cGy/ 21 fractions directed to the left breast (1000/ 5256 cGy LSB). The patient is seen today in 6 month Radiation Oncology follow-up visit. The patient states she is feeling well, denies skin complaints. Applies moisturizing lotion to skin at treatment site daily as needed. The patient is performing self breast examinations and denies lumps, bumps, skin rashes or nipple discharge. The patient denies left breast or left arm swelling. The patient reports episodes of sharp pain to left breast predominately to incisional scar area. Reports pain occurs/ resolves spontaneously within seconds. No associated symptoms. No fevers or chills. No nausea or vomiting. No change in appetite or weight. No productive cough or SOB. The patient completed bilateral mammogram 02/18/2022 that showed benign findings with no mammographic evidence of malignancy in either breast.     Patient is following with:    Saadia Tavera. Most recent visit 09/22/2022. Next visit 4 months.      Primary Care- Danielle Xiao MD      Past Medical History:   Diagnosis Date    Breast cancer ([address] 02/15/2021    IDC of upper inner quadrant of the left breast.    Cancer [facility])        Past Surgical History:   Procedure Laterality Date    BREAST SURGERY Left 2021    Left breast lumpectomy with SLN excision. CHOLECYSTECTOMY      HYSTERECTOMY (CERVIX STATUS UNKNOWN)  2016    DEVAUGHN + BSO         Social History     Socioeconomic History    Marital status:      Spouse name: Not on file    Number of children: 2    Years of education: Not on file    Highest education level: Not on file   Occupational History    Not on file   Tobacco Use    Smoking status: Former     Packs/day: 0.25     Years: 6.00     Pack years: 1.50     Types: Cigarettes     Quit date: 2021     Years since quittin.6    Smokeless tobacco: Never   Vaping Use    Vaping Use: Never used   Substance and Sexual Activity    Alcohol use: Not Currently    Drug use: Yes     Frequency: 2.0 times per week     Types: Marijuana Yolanda Ramírez)    Sexual activity: Not on file   Other Topics Concern    Not on file   Social History Narrative    Not on file     Social Determinants of Health     Financial Resource Strain: Not on file   Food Insecurity: Not on file   Transportation Needs: Not on file   Physical Activity: Not on file   Stress: Not on file   Social Connections: Not on file   Intimate Partner Violence: Not on file   Housing Stability: Not on file         Family History   Problem Relation Age of Onset    Breast Cancer Mother        Allergies:   Patient has no known allergies. No current outpatient medications on file. No current facility-administered medications for this encounter. REVIEW OF SYSTEMS:    Constitutional:  No fever, chills or rigors. Eyes: No changes in vision. No eye discharge or pain  ENT: No headaches, hearing loss or vertigo. No mouth sores or sore throat. No change in taste or smell. Cardiovascular: No chest discomfort or palpitations. No loss of consciousness or phlebitis. Respiratory: No SOB. No productive cough or hemoptysis. No wheezing or pleuritic pain. Gastrointestinal: No abdominal pain, appetite loss, blood in stools. No change in bowel habits.  No hematemesis   Genitourinary: Patient acknowledges no dysuria, trouble voiding, or hematuria. No increased frequency. Musculoskeletal: No gait disturbance, weakness or joint complaints. Integumentary: No rash or pruritis. Neurological: No headache, diplopia, change in muscle strength, numbness or tingling. No change in gait, balance, coordination, mood, affect, memory, mentation, behavior. Psychiatric: No anxiety, or depression. Endocrine: No temperature intolerance. No excessive thirst, fluid intake, or urination. No tremor. Hematologic/Lymphatic: No abnormal bruising or bleeding, blood clots or swollen lymph nodes. Allergic/Immunologic: No nasal congestion or hives. PHYSICAL EXAMINATION:   Vitals:    22 1007   BP: (!) 147/85   Pulse: 90   Resp: 18   Temp: 97.6 °F (36.4 °C)   TempSrc: Skin   SpO2: 100%   Weight: 152 lb 3.2 oz (69 kg)       There is no height or weight on file to calculate BMI. Appearance: Well-developed, well-nourished 45year old female. Skin: Warm and dry, no rashes or hives. Head: Normocephalic, atraumatic  Eyes: EOMI, no conjunctival erythema  ENMT: No pharyngeal erythema, MMM, no rhinorrhea. Neck: Supple, no elevated JVP  Chest: Left breast with no erythema. Well-healed surgical scar. Lungs: Clear to auscultation bilaterally. No wheezes, rales or rhonchi. Cardiac: Regular rate and rhythm, +S1S2, no murmurs apparent  Abdomen: Soft, round and non-tender. Bowel sounds present x 4. Extremities: Moves all extremities x 4, no lower extremity edema  Neurologic: Alert and oriented x 3.  No focal motor deficits apparent         IMAGIN/18/2022 Bilateral Mammogram: (ordered per Medical Oncology)      IMPRESSION:   Benign findings with no mammographic evidence of malignancy in either breast.         ASSESSMENT/PLAN:    Stage I (pT1c pNo cMo), triple negative Gr 3 invasive ductal carcinoma of the upper-inner quadrant of the left breast.  S/p left breast lumpectomy with SLN excision on 04/02/2021. S/p adjuvant chemotherapy 4 cycles TC completed 07/08/2021. S/p adjuvant XRT completed 09/23/2021. No evidence of malignancy in either breast per mammogram 02/18/2022. The patient is doing well post XRT completion. Skin care discussed including breast/ scar massage. Continue SBEs report any new finding to a healthcare provider. Imaging per Medical Oncology. I discussed follow up plans with Maria Del Carmen Horton. At this time, Continue Oncology follow-up visit with primary Medical Oncologist Dr. Nat Martinez. See Radiation Oncology as needed. Maria Del Carmen Horton instructed to follow up with other physicians involved in their care as directed (including but not limited to Medical Oncology, Breast Surgery and Primary Care). The patient was given our contact number in the event that if at any time they change their mind and would like to return to the clinic to see either myself or one of the Radiation Oncologists, they can simply call us and we would be happy to see them. Thank you for involving us in the management of this extremely pleasant patient. More than 25 min was in direct contact with pt coordinating/giving care. >50% of the visit was spent in counseling the pt on the following: Follow up care    The nurses notes were reviewed and incorporated into this assessment and plan.           Parveen Rueda, MSN, APRN-CNP  Certified Nurse Practitioner for [address] [place], [place] [address]: [phone]/ F: [phone]   [facility]: [phone] / F: [phone]

## 2022-11-09 NOTE — PROGRESS NOTES
Ashwin Jacob  11/9/2022  10:08 AM      Vitals:    11/09/22 1007   BP: (!) 147/85   Pulse: 90   Resp: 18   Temp: 97.6 °F (36.4 °C)   SpO2: 100%    : Wt Readings from Last 3 Encounters:   11/09/22 152 lb 3.2 oz (69 kg)   05/05/22 150 lb 1.6 oz (68.1 kg)   11/02/21 151 lb 8 oz (68.7 kg)                Current Outpatient Medications:     silver sulfADIAZINE (SILVADENE) 1 % cream, Apply topically daily. (Patient not taking: Reported on 5/5/2022), Disp: 85 g, Rfl: 3      Patient is seen today in follow up for triple negative left breast cancer with Carlos ALLEN NP. The patient had CTV L breast with boost 08/25-09/23/2021 and received 21fx/ 5256 cGY. She completed 4 cycles of Taxotere and Cytoxan on 07/08/2021. She had a mammogram on 02/18/2022 that was negative for disease. She follows with Dr Xiomara De Los Santos for medical oncology, last seen 9/22/2022. The patient's only complaint today is a sharp pain in her left breast scar area. Dr Xiomara De Los Santos and NP notified. All questions addressed from a nursing perspective. FALLS RISK SCREENING ASSESSMENT    Instructions:  Assess the patient and enter the appropriate indicators that are present for fall risk identification. Total the numbers entered and assign a fall risk score from Table 2.  Reassess patient at a minimum every 12 weeks or with status change. Assessment   Date  11/9/2022     1. Mental Ability: confusion/cognitively impaired No - 0       2. Elimination Issues: incontinence, frequency No - 0       3. Ambulatory: use of assistive devices (walker, cane, off-loading devices), attached to equipment (IV pole, oxygen) No - 0     4. Sensory Limitations: dizziness, vertigo, impaired vision Yes - 3       5. Age Less than 65 years - 0       6. Medication: diuretics, strong analgesics, hypnotics, sedatives, antihypertensive agents   No - 0   7.   Falls:  recent history of falls within the last 3 months (not to include slipping or tripping)   No - 0   TOTAL 3    If score of 4 or greater was education given? No       TABLE 2   Risk Score Risk Level Plan of Care   0-3 Little or  No Risk 1. Provide assistance as indicated for ambulation activities  2. Reorient confused/cognitively impaired patient  3. Call-light/bell within patient's reach  4. Chair/bed in low position, stretcher/bed with siderails up except when performing patient care activities  5. Educate patient/family/caregiver on falls prevention  6.  Reassess in 12 weeks or with any noted change in patient condition which places them at a risk for a fall   4-6 Moderate Risk 1. Provide assistance as indicated for ambulation activities  2. Reorient confused/cognitively impaired patient  3. Call-light/bell within patient's reach  4. Chair/bed in low position, stretcher/bed with siderails up except when performing patient care activities  5. Educate patient/family/caregiver on falls prevention  6. Falls risk precaution (Yellow sticker Level II) placed on patient chart   7 or   Higher High Risk 1. Place patient in easily observable treatment room  2. Patient attended at all times by family member or staff  3. Provide assistance as indicated for ambulation activities  4. Reorient confused/cognitively impaired patient  5. Call-light/bell within patient's reach  6. Chair/bed in low position, stretcher/bed with siderails up except when performing patient care activities  7. Educate patient/family/caregiver on falls prevention  8. Falls risk precaution (Yellow sticker Level III) placed on patient chart           MALNUTRITION RISK SCREENING ASSESSMENT    11/9/2022   Patient:  Trupti Blake  Sex:  female    Instructions:  Assess the patient and enter the appropriate indicators that are present for nutrition risk identification. Total the numbers entered and assign a risk score. Follow the appropriate action for total score listed below. Assessment   Date  11/9/2022     Have you lost weight without trying? 0- No     Have you been eating poorly because of a decreased appetite? 0- No   3. Do you have a diagnosis of head and neck cancer? 0- No                                                                                    TOTAL 0          Score of 0-1: No action  Score 2 or greater:   For Non-Diabetic Patient: Recommend adding Ensure Complete 2 x daily and provide patient with Ensure wellness bag with coupons  For Diabetic Patient: Recommend adding Glucerna Shake 2 x daily and provide patient with Glucerna Wellness bag with coupons  Route to the dietitian via Roseline Wilson RN

## 2022-11-29 NOTE — PROGRESS NOTES
----- Message from Christin Arrington sent at 11/29/2022 11:17 AM CST -----  .Type:  Patient Call Back    Who Called: PT       Does the patient know what this is regarding?: PT APPOINTMENT DAVE WAS CANCELED HE SHOULDN'T HAVE BEEN SCHEDULED WITH HER SHE DOESN'T SEE HEADACHES PT TOOK OFFICE FROM WORK HE NEEDS A WORK NOTE AND SPEAK WITH THE OFFICE     Would the patient rather a call back YES     Best Call Back Number: [phone]    Additional Information: Thank You        DEPARTMENT OF RADIATION ONCOLOGY ON TREATMENT VISIT         9/9/2021      NAME:  Phyllis Infante    YOB: 1970    Diagnosis: breast cancer    SUBJECTIVE:   Phyllis Infante has now received fractionated external beam radiation therapy - ongoing. Past medical, surgical, social and family histories reviewed and updated as indicated. Pain: controlled    ALLERGIES:  Patient has no known allergies. No current outpatient medications on file. No current facility-administered medications for this encounter. OBJECTIVE:  Alert and fully ambulatory. Pleasant and conversant. Physical Examination: General appearance - alert, well appearing, and in no distress. Wt Readings from Last 3 Encounters:   09/08/21 151 lb (68.5 kg)   09/01/21 153 lb 8 oz (69.6 kg)   08/09/21 157 lb 14.4 oz (71.6 kg)         ASSESSMENT/PLAN:     Patient is tolerating treatments well with expected toxicities. RBA were reviewed prior to first fraction and PRN. Current and planned dose reviewed. Goals of treatment and potential side effects were reviewed with the patient PRN. Treatment imaging has been personally reviewed for accuracy and precision. Questions answered to apparent satisfaction. Treatments will continue as planned. Ana Ortiz.  Taco Fuller MD MS DABR  Radiation Oncologist        [facility] Zachary Guillen ([address] [place] [address]): [phone] /// FAX: [phone]  [facility]): [phone] /// FAX: [phone]  [address] [place] [address]): [phone] /// FAX: [phone]

## 2023-07-18 ENCOUNTER — APPOINTMENT (OUTPATIENT)
Dept: GENERAL RADIOLOGY | Age: 53
End: 2023-07-18
Payer: COMMERCIAL

## 2023-07-18 ENCOUNTER — HOSPITAL ENCOUNTER (EMERGENCY)
Age: 53
Discharge: HOME | End: 2023-07-18
Payer: COMMERCIAL

## 2023-07-18 VITALS
HEART RATE: 71 BPM | OXYGEN SATURATION: 100 % | RESPIRATION RATE: 20 BRPM | SYSTOLIC BLOOD PRESSURE: 126 MMHG | WEIGHT: 150 LBS | HEIGHT: 62 IN | DIASTOLIC BLOOD PRESSURE: 80 MMHG | TEMPERATURE: 98.7 F | BODY MASS INDEX: 27.6 KG/M2
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
## 2023-07-18 DIAGNOSIS — S90.31XA CONTUSION OF RIGHT FOOT, INITIAL ENCOUNTER: ICD-10-CM

## 2023-07-18 DIAGNOSIS — S80.02XA CONTUSION OF LEFT KNEE, INITIAL ENCOUNTER: ICD-10-CM

## 2023-07-18 DIAGNOSIS — S80.212A ABRASION OF LEFT KNEE, INITIAL ENCOUNTER: ICD-10-CM

## 2023-07-18 PROCEDURE — 73562 X-RAY EXAM OF KNEE 3: CPT

## 2023-07-18 PROCEDURE — 90715 TDAP VACCINE 7 YRS/> IM: CPT | Performed by: NURSE PRACTITIONER

## 2023-07-18 PROCEDURE — 6370000000 HC RX 637 (ALT 250 FOR IP): Performed by: NURSE PRACTITIONER

## 2023-07-18 PROCEDURE — 99211 OFF/OP EST MAY X REQ PHY/QHP: CPT

## 2023-07-18 PROCEDURE — 90471 IMMUNIZATION ADMIN: CPT | Performed by: NURSE PRACTITIONER

## 2023-07-18 PROCEDURE — 73630 X-RAY EXAM OF FOOT: CPT

## 2023-07-18 PROCEDURE — 6360000002 HC RX W HCPCS: Performed by: NURSE PRACTITIONER

## 2023-07-18 RX ORDER — IBUPROFEN 400 MG/1
800 TABLET ORAL ONCE
Status: COMPLETED | OUTPATIENT
Start: 2023-07-18 | End: 2023-07-18

## 2023-07-18 RX ADMIN — TETANUS TOXOID, REDUCED DIPHTHERIA TOXOID AND ACELLULAR PERTUSSIS VACCINE, ADSORBED 0.5 ML: 5; 2.5; 8; 8; 2.5 SUSPENSION INTRAMUSCULAR at 16:16

## 2023-07-18 RX ADMIN — IBUPROFEN 800 MG: 400 TABLET, FILM COATED ORAL at 16:15

## 2023-07-18 ASSESSMENT — PAIN DESCRIPTION - DESCRIPTORS: DESCRIPTORS: ACHING;DISCOMFORT

## 2023-07-18 ASSESSMENT — PAIN SCALES - GENERAL: PAINLEVEL_OUTOF10: 6

## 2023-07-18 ASSESSMENT — PAIN DESCRIPTION - LOCATION: LOCATION: KNEE;TOE (COMMENT WHICH ONE)

## 2023-07-18 ASSESSMENT — PAIN - FUNCTIONAL ASSESSMENT: PAIN_FUNCTIONAL_ASSESSMENT: 0-10

## 2023-07-18 ASSESSMENT — PAIN DESCRIPTION - ORIENTATION: ORIENTATION: RIGHT

## 2024-03-01 ENCOUNTER — HOSPITAL ENCOUNTER (OUTPATIENT)
Dept: GENERAL RADIOLOGY | Age: 54
End: 2024-03-01
Payer: COMMERCIAL

## 2024-03-01 ENCOUNTER — HOSPITAL ENCOUNTER (OUTPATIENT)
Dept: GENERAL RADIOLOGY | Age: 54
Discharge: HOME | End: 2024-03-01
Payer: COMMERCIAL

## 2024-03-01 VITALS — WEIGHT: 150 LBS | BODY MASS INDEX: 27.44 KG/M2

## 2024-03-01 DIAGNOSIS — N64.4 BREAST PAIN: ICD-10-CM

## 2024-03-01 PROCEDURE — G0279 TOMOSYNTHESIS, MAMMO: HCPCS

## 2025-04-25 ENCOUNTER — HOSPITAL ENCOUNTER (OUTPATIENT)
Dept: GENERAL RADIOLOGY | Age: 55
Discharge: HOME | End: 2025-04-27
Payer: COMMERCIAL

## 2025-04-25 VITALS — HEIGHT: 62 IN | BODY MASS INDEX: 27.23 KG/M2 | WEIGHT: 148 LBS

## 2025-04-25 DIAGNOSIS — Z12.31 ENCOUNTER FOR SCREENING MAMMOGRAM FOR MALIGNANT NEOPLASM OF BREAST: ICD-10-CM

## 2025-04-25 PROCEDURE — 77063 BREAST TOMOSYNTHESIS BI: CPT
